# Patient Record
Sex: MALE | URBAN - METROPOLITAN AREA
[De-identification: names, ages, dates, MRNs, and addresses within clinical notes are randomized per-mention and may not be internally consistent; named-entity substitution may affect disease eponyms.]

---

## 2023-08-07 ENCOUNTER — APPOINTMENT (OUTPATIENT)
Dept: URBAN - METROPOLITAN AREA CLINIC 216 | Age: 73
Setting detail: DERMATOLOGY
End: 2023-08-07

## 2023-08-07 DIAGNOSIS — D485 NEOPLASM OF UNCERTAIN BEHAVIOR OF SKIN: ICD-10-CM

## 2023-08-07 PROBLEM — D40.8 NEOPLASM OF UNCERTAIN BEHAVIOR OF OTHER SPECIFIED MALE GENITAL ORGANS: Status: ACTIVE | Noted: 2023-08-07

## 2023-08-07 PROCEDURE — OTHER BIOPSY BY SHAVE METHOD: OTHER

## 2023-08-07 PROCEDURE — 54100 BIOPSY OF PENIS: CPT

## 2023-08-07 ASSESSMENT — LOCATION SIMPLE DESCRIPTION DERM: LOCATION SIMPLE: PENIS

## 2023-08-07 ASSESSMENT — LOCATION ZONE DERM: LOCATION ZONE: PENIS

## 2023-08-07 ASSESSMENT — LOCATION DETAILED DESCRIPTION DERM: LOCATION DETAILED: LEFT DORSAL SHAFT OF PENIS

## 2023-08-25 ENCOUNTER — APPOINTMENT (OUTPATIENT)
Dept: URBAN - METROPOLITAN AREA CLINIC 216 | Age: 73
Setting detail: DERMATOLOGY
End: 2023-08-28

## 2023-08-25 PROBLEM — C60.2 MALIGNANT NEOPLASM OF BODY OF PENIS: Status: ACTIVE | Noted: 2023-08-25

## 2023-08-25 PROCEDURE — OTHER TREATMENT REGIMEN: OTHER

## 2023-08-25 PROCEDURE — 12042 INTMD RPR N-HF/GENIT2.6-7.5: CPT

## 2023-08-25 PROCEDURE — 17311 MOHS 1 STAGE H/N/HF/G: CPT

## 2023-08-25 PROCEDURE — OTHER MOHS SURGERY: OTHER

## 2023-08-25 NOTE — PROCEDURE: MOHS SURGERY
never used Bcc Infiltrative Histology Text: There were numerous aggregates of basaloid cells demonstrating an infiltrative pattern.